# Patient Record
Sex: MALE | Race: WHITE | NOT HISPANIC OR LATINO | Employment: FULL TIME | URBAN - METROPOLITAN AREA
[De-identification: names, ages, dates, MRNs, and addresses within clinical notes are randomized per-mention and may not be internally consistent; named-entity substitution may affect disease eponyms.]

---

## 2018-05-21 ENCOUNTER — OFFICE VISIT (OUTPATIENT)
Dept: URGENT CARE | Facility: CLINIC | Age: 23
End: 2018-05-21
Payer: COMMERCIAL

## 2018-05-21 VITALS
WEIGHT: 215 LBS | DIASTOLIC BLOOD PRESSURE: 76 MMHG | HEIGHT: 71 IN | TEMPERATURE: 97.8 F | RESPIRATION RATE: 16 BRPM | BODY MASS INDEX: 30.1 KG/M2 | HEART RATE: 60 BPM | OXYGEN SATURATION: 99 % | SYSTOLIC BLOOD PRESSURE: 116 MMHG

## 2018-05-21 DIAGNOSIS — S43.431A SUPERIOR GLENOID LABRUM LESION OF RIGHT SHOULDER, INITIAL ENCOUNTER: ICD-10-CM

## 2018-05-21 DIAGNOSIS — M25.511 RIGHT ANTERIOR SHOULDER PAIN: ICD-10-CM

## 2018-05-21 PROCEDURE — 99204 OFFICE O/P NEW MOD 45 MIN: CPT | Performed by: NURSE PRACTITIONER

## 2018-05-21 RX ORDER — DEXTROAMPHETAMINE SACCHARATE, AMPHETAMINE ASPARTATE MONOHYDRATE, DEXTROAMPHETAMINE SULFATE AND AMPHETAMINE SULFATE 5; 5; 5; 5 MG/1; MG/1; MG/1; MG/1
CAPSULE, EXTENDED RELEASE ORAL
Refills: 0 | COMMUNITY
Start: 2018-04-11

## 2018-05-22 ASSESSMENT — ENCOUNTER SYMPTOMS: JOINT SWELLING: 1

## 2018-05-23 ENCOUNTER — HOSPITAL ENCOUNTER (OUTPATIENT)
Dept: RADIOLOGY | Facility: MEDICAL CENTER | Age: 23
End: 2018-05-23
Attending: NURSE PRACTITIONER
Payer: COMMERCIAL

## 2018-05-23 DIAGNOSIS — M25.511 RIGHT ANTERIOR SHOULDER PAIN: ICD-10-CM

## 2018-05-23 PROCEDURE — 73221 MRI JOINT UPR EXTREM W/O DYE: CPT | Mod: RT

## 2018-05-23 NOTE — PROGRESS NOTES
"Subjective:      Olaf Kwong is a 22 y.o. male who presents with Shoulder Pain (x6days right shoulder pain, injured right shoulder at gym lifting weights)            Shoulder Pain   This is a new problem. Episode onset: pt reports he was working out at the gym last week doing an overhead press with weights when he heard a pop to his right shoulder. He admits the pain did not start immediately but developed a few days later. He admits his arm feels weak. The problem occurs constantly. The problem has been unchanged. Associated symptoms include joint swelling. Associated symptoms comments: Reports history of injuring this same shoulder several years ago but there was nothing done for it. The pain eventually subsided. He reports the pain is much worse this time. He is right arm dominant. He has tried NSAIDs and ice for the symptoms. The treatment provided no relief.       Review of Systems   Musculoskeletal: Positive for joint pain (right shoulder) and joint swelling.   All other systems reviewed and are negative.    History reviewed. No pertinent past medical history. History reviewed. No pertinent surgical history.   Social History     Social History   • Marital status: Single     Spouse name: N/A   • Number of children: N/A   • Years of education: N/A     Occupational History   • Not on file.     Social History Main Topics   • Smoking status: Never Smoker   • Smokeless tobacco: Never Used   • Alcohol use Yes      Comment: social   • Drug use: No   • Sexual activity: Not on file     Other Topics Concern   • Not on file     Social History Narrative   • No narrative on file          Objective:     /76   Pulse 60   Temp 36.6 °C (97.8 °F)   Resp 16   Ht 1.803 m (5' 11\")   Wt 97.5 kg (215 lb)   SpO2 99%   BMI 29.99 kg/m²      Physical Exam   Constitutional: He is oriented to person, place, and time. Vital signs are normal. He appears well-developed and well-nourished.   HENT:   Head: " Normocephalic and atraumatic.   Eyes: EOM are normal. Pupils are equal, round, and reactive to light.   Neck: Normal range of motion.   Cardiovascular: Normal rate and regular rhythm.    Pulmonary/Chest: Effort normal.   Musculoskeletal: Normal range of motion.        Right shoulder: He exhibits tenderness (anterolateral shoulder), swelling (mild), pain and decreased strength. He exhibits no effusion.   Negative drop arm test  Good  strength  Distal NV intact   Neurological: He is alert and oriented to person, place, and time.   Skin: Skin is warm and dry. Capillary refill takes less than 2 seconds.   Psychiatric: He has a normal mood and affect. His speech is normal and behavior is normal. Thought content normal.   Vitals reviewed.           5/23/2018 5:37 PM    HISTORY/REASON FOR EXAM:  Shoulder pain anteriorly, multiple prior injuries    TECHNIQUE/EXAM DESCRIPTION:  MRI of the RIGHT shoulder without contrast.    The study was performed on a Jb 3.0 Stephanie MRI scanner.    T1 sagittal, fast spin-echo T2 fat-suppressed oblique coronal, sagittal, and axial and intermediate fast spin-echo oblique coronal images were obtained.    COMPARISON: No radiograph or MRI    FINDINGS:  Glenohumeral joint space: There is  a physiologic amount of joint fluid.    Rotator cuff: No tear, tendinopathy or atrophy.    Labrum: Slight high signal is seen along the superior labrum anteriorly. No adjacent cyst.    The long head biceps is within normal limits.    Osseous structures/cartilage: No acute fracture is seen. No focal cartilage defect or glenohumeral osteophyte formation.    Normal acromioclavicular joint. There is some lateral acromion downsloping but no abnormal bursa.      Impression       Likely small SLAP tear. MR with intra-articular contrast may help clarify    Lateral acromion downsloping without evidence of outlet stenosis/bursitis          Assessment/Plan:     1. Right anterior shoulder pain  - MR-SHOULDER-W/O  RIGHT; Future    2. Superior glenoid labrum lesion of right shoulder, initial encounter    Discussed results with patient. He has moved back to [place] and will look for an orthopedic surgeon there to discuss his shoulder problem and plan for future treatment.   Advised him he can obtain imaging records through medical records or [employer] activation. He understands  Alternate tylenol and ibuprofen PRN pain  No heavy lifting until evaluation by ortho  All questions answered  Supportive care, differential diagnoses, and indications for immediate follow-up discussed with patient.    Pathogenesis of diagnosis discussed including typical length and natural progression.      Instructed to return to  or nearest emergency department if symptoms fail to improve, for any change in condition, further concerns, or new concerning symptoms.  Patient states understanding of the plan of care and discharge instructions.

## 2018-05-24 ENCOUNTER — TELEPHONE (OUTPATIENT)
Dept: URGENT CARE | Facility: CLINIC | Age: 23
End: 2018-05-24

## 2018-05-24 NOTE — TELEPHONE ENCOUNTER
Patient is requesting results of MRI, has been informed you are not here today but will call him once you get the results